# Patient Record
Sex: MALE | Race: OTHER | HISPANIC OR LATINO | URBAN - METROPOLITAN AREA
[De-identification: names, ages, dates, MRNs, and addresses within clinical notes are randomized per-mention and may not be internally consistent; named-entity substitution may affect disease eponyms.]

---

## 2018-07-12 ENCOUNTER — EMERGENCY (EMERGENCY)
Facility: HOSPITAL | Age: 68
LOS: 1 days | Discharge: HOME | End: 2018-07-12
Attending: EMERGENCY MEDICINE | Admitting: EMERGENCY MEDICINE
Payer: MEDICARE

## 2018-07-12 VITALS
HEART RATE: 87 BPM | SYSTOLIC BLOOD PRESSURE: 131 MMHG | RESPIRATION RATE: 16 BRPM | OXYGEN SATURATION: 98 % | TEMPERATURE: 98 F | DIASTOLIC BLOOD PRESSURE: 79 MMHG

## 2018-07-12 VITALS
HEART RATE: 81 BPM | OXYGEN SATURATION: 98 % | TEMPERATURE: 98 F | RESPIRATION RATE: 16 BRPM | SYSTOLIC BLOOD PRESSURE: 133 MMHG | DIASTOLIC BLOOD PRESSURE: 78 MMHG

## 2018-07-12 PROCEDURE — 99283 EMERGENCY DEPT VISIT LOW MDM: CPT

## 2018-07-12 PROCEDURE — 73562 X-RAY EXAM OF KNEE 3: CPT | Mod: 26,LT

## 2018-07-12 PROCEDURE — 73562 X-RAY EXAM OF KNEE 3: CPT

## 2018-07-12 RX ORDER — IBUPROFEN 200 MG
600 TABLET ORAL ONCE
Qty: 0 | Refills: 0 | Status: COMPLETED | OUTPATIENT
Start: 2018-07-12 | End: 2018-07-12

## 2018-07-12 RX ORDER — ATORVASTATIN CALCIUM 80 MG/1
1 TABLET, FILM COATED ORAL
Qty: 0 | Refills: 0 | COMMUNITY

## 2018-07-12 RX ORDER — OXYCODONE AND ACETAMINOPHEN 5; 325 MG/1; MG/1
2 TABLET ORAL ONCE
Qty: 0 | Refills: 0 | Status: DISCONTINUED | OUTPATIENT
Start: 2018-07-12 | End: 2018-07-12

## 2018-07-12 RX ORDER — FUROSEMIDE 40 MG
1 TABLET ORAL
Qty: 0 | Refills: 0 | COMMUNITY

## 2018-07-12 RX ORDER — ASPIRIN/CALCIUM CARB/MAGNESIUM 324 MG
1 TABLET ORAL
Qty: 0 | Refills: 0 | COMMUNITY

## 2018-07-12 RX ADMIN — OXYCODONE AND ACETAMINOPHEN 2 TABLET(S): 5; 325 TABLET ORAL at 13:24

## 2018-07-12 RX ADMIN — Medication 600 MILLIGRAM(S): at 12:20

## 2018-07-12 NOTE — ED PROVIDER NOTE - OBJECTIVE STATEMENT
JL 69 yo obese m w/ pmh dm, htn, hyperlipidemia, copd, sleep apnea presents to ed for left knee pain for the past few weeks.  Pt reports that he has had dull achy pain to left knee but that this morning pain got worse when he woke up and went to walk.  Pt otherwise denies accidents/injuries/traumas, numbness/tingling, other musculoskeletal pain

## 2018-07-12 NOTE — ED ADULT TRIAGE NOTE - CHIEF COMPLAINT QUOTE
Pt BIBA from home c/o L knee pain for the past two weeks, has worsened this past week and today pt attempted to get up and could not bear weight and fell. Denies head injury.

## 2018-07-12 NOTE — ED PROVIDER NOTE - ATTENDING CONTRIBUTION TO CARE
67 yo obese M with pmh of dm, htn, hyperlipidemia, copd, sleep apnea presents to ed with atraumatic  left knee pain for the past few weeks, which worsened overnight and pt unable to bear weight today. Pt otherwise denies injuries/traumas, numbness/tingling/ fevers/ chills. Pt AAO, NAD, left knee with mild swelling and TTP with decreased ROM sec to pain, NO warmth, No skin changes. XRs done. Pain meds prn. Pt given crutches and to f/up outpt with Ortho.

## 2018-07-12 NOTE — ED PROVIDER NOTE - MUSCULOSKELETAL, MLM
Spine appears normal, decreased rom of left knee, no gross deformities or sts of knee, pt able to ambulate with crutches

## 2018-07-12 NOTE — ED PROVIDER NOTE - MEDICAL DECISION MAKING DETAILS
Case d/w Dr. [place], JL 69 yo obese m w/ pmh dm, htn, hyperlipidemia, copd, sleep apnea presents to ed for left knee pain for the past few weeks, pt with worsening pain today.  On exam no gross deformities or sts, slightly decreased rom of left knee secondary to pain, rest of rom intact.  Pt given motrin and percocet for pain.  Xray of left knee showed osteoarthritis, no other acute findings, pt able to ambulate with crutches, will give outopatient orthopedic f/u

## 2018-07-16 DIAGNOSIS — Z79.82 LONG TERM (CURRENT) USE OF ASPIRIN: ICD-10-CM

## 2018-07-16 DIAGNOSIS — I10 ESSENTIAL (PRIMARY) HYPERTENSION: ICD-10-CM

## 2018-07-16 DIAGNOSIS — M25.562 PAIN IN LEFT KNEE: ICD-10-CM

## 2018-07-16 DIAGNOSIS — E78.5 HYPERLIPIDEMIA, UNSPECIFIED: ICD-10-CM

## 2018-07-16 DIAGNOSIS — E11.9 TYPE 2 DIABETES MELLITUS WITHOUT COMPLICATIONS: ICD-10-CM

## 2018-07-16 DIAGNOSIS — J44.9 CHRONIC OBSTRUCTIVE PULMONARY DISEASE, UNSPECIFIED: ICD-10-CM

## 2018-07-16 DIAGNOSIS — Z79.899 OTHER LONG TERM (CURRENT) DRUG THERAPY: ICD-10-CM
